# Patient Record
Sex: FEMALE | Race: WHITE | Employment: OTHER
[De-identification: names, ages, dates, MRNs, and addresses within clinical notes are randomized per-mention and may not be internally consistent; named-entity substitution may affect disease eponyms.]

---

## 2017-07-01 ENCOUNTER — HEALTH MAINTENANCE LETTER (OUTPATIENT)
Age: 72
End: 2017-07-01

## 2017-08-22 ENCOUNTER — OFFICE VISIT (OUTPATIENT)
Dept: ALLERGY | Facility: CLINIC | Age: 72
End: 2017-08-22
Payer: COMMERCIAL

## 2017-08-22 VITALS
BODY MASS INDEX: 33.81 KG/M2 | SYSTOLIC BLOOD PRESSURE: 125 MMHG | DIASTOLIC BLOOD PRESSURE: 81 MMHG | WEIGHT: 203.2 LBS | OXYGEN SATURATION: 94 % | HEART RATE: 93 BPM

## 2017-08-22 DIAGNOSIS — H10.13 ALLERGIC CONJUNCTIVITIS, BILATERAL: ICD-10-CM

## 2017-08-22 DIAGNOSIS — J30.89 OTHER ALLERGIC RHINITIS: Primary | ICD-10-CM

## 2017-08-22 DIAGNOSIS — J30.0 CHRONIC VASOMOTOR RHINITIS: ICD-10-CM

## 2017-08-22 PROCEDURE — 36415 COLL VENOUS BLD VENIPUNCTURE: CPT | Performed by: ALLERGY & IMMUNOLOGY

## 2017-08-22 PROCEDURE — 86003 ALLG SPEC IGE CRUDE XTRC EA: CPT | Performed by: ALLERGY & IMMUNOLOGY

## 2017-08-22 PROCEDURE — 99000 SPECIMEN HANDLING OFFICE-LAB: CPT | Performed by: ALLERGY & IMMUNOLOGY

## 2017-08-22 PROCEDURE — 99204 OFFICE O/P NEW MOD 45 MIN: CPT | Mod: 25 | Performed by: ALLERGY & IMMUNOLOGY

## 2017-08-22 PROCEDURE — 95004 PERQ TESTS W/ALRGNC XTRCS: CPT | Performed by: ALLERGY & IMMUNOLOGY

## 2017-08-22 RX ORDER — LOPERAMIDE HYDROCHLORIDE 2 MG/1
TABLET ORAL
COMMUNITY
Start: 2012-08-24

## 2017-08-22 RX ORDER — AMITRIPTYLINE HYDROCHLORIDE 50 MG/1
50 TABLET ORAL
COMMUNITY
Start: 2017-08-04

## 2017-08-22 RX ORDER — DOXYCYCLINE 50 MG/1
CAPSULE ORAL
Refills: 11 | COMMUNITY
Start: 2017-06-13

## 2017-08-22 RX ORDER — AZELASTINE HYDROCHLORIDE 0.5 MG/ML
1 SOLUTION/ DROPS OPHTHALMIC 2 TIMES DAILY
Qty: 1 BOTTLE | Refills: 11 | Status: SHIPPED | OUTPATIENT
Start: 2017-08-22

## 2017-08-22 RX ORDER — IPRATROPIUM BROMIDE 42 UG/1
2 SPRAY, METERED NASAL 4 TIMES DAILY PRN
Qty: 1 BOX | Refills: 3 | Status: SHIPPED | OUTPATIENT
Start: 2017-08-22

## 2017-08-22 NOTE — MR AVS SNAPSHOT
After Visit Summary   8/22/2017    Kassy Calix    MRN: [id number]           Patient Information     Date Of Birth          1945        Visit Information        Provider Department      8/22/2017 11:00 AM Chelsey Rogel MD [facility] [place]        Today's Diagnoses     Other allergic rhinitis    -  1    Allergic conjunctivitis, bilateral        Chronic vasomotor rhinitis          Care Instructions    If you have any questions regarding your allergies, asthma, or what we discussed during your visit today please call the allergy clinic or contact us via [employer].    [facility]: [phone]      You can try the following medications:  Ipratropium nasal spray - Use 2 sprays in each nostril up to 4 times daily as needed to help with the runny nose  Azelastine eye drops - 1 drop in each eye twice daily as needed      We will contact you in about 1 week with your lab results          Follow-ups after your visit        Who to contact     If you have questions or need follow up information about today's clinic visit or your schedule please contact [facility] directly at [phone].  Normal or non-critical lab and imaging results will be communicated to you by Pap[employer]ik[employer]art, letter or phone within 4 business days after the clinic has received the results. If you do not hear from us within 7 days, please contact the clinic through [employer] or phone. If you have a critical or abnormal lab result, we will notify you by phone as soon as possible.  Submit refill requests through [employer] or call your pharmacy and they will forward the refill request to us. Please allow 3 business days for your refill to be completed.          Additional Information About Your Visit        Papriikahart Information     [employer] gives you secure access to your electronic health record. If you see a primary care provider, you can also send messages to your care team and make appointments. If you have questions,  please call your primary care clinic.  If you do not have a primary care provider, please call [phone] and they will assist you.        Care EveryWhere ID     This is your Care EveryWhere ID. This could be used by other organizations to access your [facility] records  [id number]        Your Vitals Were     Pulse Pulse Oximetry BMI (Body Mass Index)             93 94% 33.81 kg/m2          Blood Pressure from Last 3 Encounters:   08/22/17 125/81   10/17/16 120/80   09/16/16 136/72    Weight from Last 3 Encounters:   08/22/17 92.2 kg (203 lb 3.2 oz)   10/17/16 97.1 kg (214 lb)   09/16/16 98 kg (216 lb)              We Performed the Following     Allergen alternaria alternata IgE     Allergen tyshawn white IgE     Allergen aspergillus fumigatus IgE     Allergen cat epithellium IgE     Allergen Cedar IgE     Allergen cladosporium herbarum IgE     Allergen cottonwood IgE     Allergen D farinae IgE     Allergen D pteronyssinus IgE     Allergen dog epithelium IgE     Allergen elm IgE     Allergen English plantain IgE     Allergen Epicoccum purpurascens IgE     Allergen giant ragweed IgE     Allergen Nicholas grass IgE     Allergen lamb's quarter IgE     Allergen maple box elder IgE     Allergen Mugwort IgE     Allergen oak white IgE     Allergen orchard grass IgE     Allergen penicillium notatum IgE     Allergen ragweed short IgE     Allergen Red Galvin IgE     Allergen Sagebrush Wormwood IgE     Allergen Sheep Sorrel IgE     Allergen silver  birch IgE     Allergen thistle Russian IgE     Allergen lacy IgE     Allergen Tree White Galvin IgE     Allergen Flushing Tree     Allergen Weed Nettle IgE     Allergen white pine IgE     Allergen, Kochia/Firebush          Today's Medication Changes          These changes are accurate as of: 8/22/17 11:52 AM.  If you have any questions, ask your nurse or doctor.               Start taking these medicines.        Dose/Directions    azelastine 0.05 % Soln ophthalmic solution    Commonly known as:  OPTIVAR   Used for:  Allergic conjunctivitis, bilateral   Started by:  Chelsey Rogel MD        Dose:  1 drop   Apply 1 drop to eye 2 times daily   Quantity:  1 Bottle   Refills:  11       ipratropium 0.06 % spray   Commonly known as:  ATROVENT   Used for:  Chronic vasomotor rhinitis   Started by:  Chelsey Rogel MD        Dose:  2 spray   Spray 2 sprays into both nostrils 4 times daily as needed for rhinitis   Quantity:  1 Box   Refills:  3            Where to get your medicines      These medications were sent to [facility] [id number] [place] [address] [address] [facility] [address] [place] [address] [facility]  [address], [facility] [address]     Phone:  [phone]     azelastine 0.05 % Soln ophthalmic solution    ipratropium 0.06 % spray                Primary Care Provider Office Phone # Fax #    Tiki Patterson [phone]       [place] [address] [place] [address]        Equal Access to Services     [facility] AH: Hadii aad ku hadasho Soomaali, waaxda luqadaha, qaybta kaalmada adeegyada, waxay padilla hayelizabeth salter . So [facility] [phone].    ATENCIÓN: Si habla español, tiene a eng disposición servicios gratuitos de asistencia lingüística. [facility] [phone].    We comply with applicable federal civil rights laws and [place] laws. We do not discriminate on the basis of race, color, national origin, age, disability sex, sexual orientation or gender identity.            Thank you!     Thank you for choosing [facility]  for your care. Our goal is always to provide you with excellent care. Hearing back from our patients is one way we can continue to improve our services. Please take a few minutes to complete the written survey that you may receive in the mail after your visit with us. Thank you!             Your Updated Medication List - Protect others around you: Learn how to safely use, store and throw  away your medicines at www.disposemymeds.org.          This list is accurate as of: 8/22/17 11:52 AM.  Always use your most recent med list.                   Brand Name Dispense Instructions for use Diagnosis    amitriptyline 50 MG tablet    ELAVIL     Take 50 mg by mouth        azelastine 0.05 % Soln ophthalmic solution    OPTIVAR    1 Bottle    Apply 1 drop to eye 2 times daily    Allergic conjunctivitis, bilateral       CALCIUM CITRATE PO      Take 1,200 mg by mouth daily.        doxycycline 20 MG tablet    PERIOSTAT          doxycycline Monohydrate 50 MG Caps capsule      TK 1 C PO BID        ipratropium 0.06 % spray    ATROVENT    1 Box    Spray 2 sprays into both nostrils 4 times daily as needed for rhinitis    Chronic vasomotor rhinitis       levothyroxine 150 MCG tablet    SYNTHROID/LEVOTHROID          loperamide 2 MG tablet    IMODIUM A-D     Takes 4 tabs daily.        metroNIDAZOLE 0.75 % topical gel    METROGEL          Multi-vitamin Tabs tablet   Generic drug:  multivitamin, therapeutic with minerals      1 TABLET DAILY        ranitidine 150 MG tablet    ZANTAC     Take 1/2 to 1 tab po qd        sertraline 100 MG tablet    ZOLOFT     Take 1 tablet by mouth daily.        vitamin D 2000 UNITS tablet      1 tablet daily        VOLTAREN 1 % Gel topical gel   Generic drug:  diclofenac      Apply 4 g topically

## 2017-08-22 NOTE — NURSING NOTE
"Chief Complaint   Patient presents with     Consult     post nasal drip, itchy and water eyes.        Initial /81  Pulse 93  Wt 92.2 kg (203 lb 3.2 oz)  SpO2 94%  BMI 33.81 kg/m2 Estimated body mass index is 33.81 kg/(m^2) as calculated from the following:    Height as of 9/16/16: 1.651 m (5' 5\").    Weight as of this encounter: 92.2 kg (203 lb 3.2 oz).  Medication Reconciliation: complete   Linda Raman MA.... 11:00 AM....8/22/2017      "

## 2017-08-22 NOTE — PATIENT INSTRUCTIONS
If you have any questions regarding your allergies, asthma, or what we discussed during your visit today please call the allergy clinic or contact us via [employer].    [facility]: [phone]      You can try the following medications:  Ipratropium nasal spray - Use 2 sprays in each nostril up to 4 times daily as needed to help with the runny nose  Azelastine eye drops - 1 drop in each eye twice daily as needed      We will contact you in about 1 week with your lab results

## 2017-08-22 NOTE — PROGRESS NOTES
Kassy Calix was seen in the Allergy Clinic at [facility]. The following are my recommendations regarding her Allergic Rhinitis, Allergic Conjunctivitis and Vasomotor Rhinitis    1. Will obtain in vitro IgE testing to seasonal and perennial aeroallergens  2. Begin azelastine eye drops, 1 drop in each eye twice daily as needed  3. Begin ipratropium nasal spray, 2 sprays in each nostril up to 4 times daily as needed  4. Consider allergen immunotherapy treatment pending lab results  5. Schedule follow-up pending lab results       Kassy Calix is a 71 year old White female being seen today in consultation for possible allergies. She reports having symptoms of itchy and watery eyes, rhinorrhea, and post-nasal drainage that are present throughout the year without seasonal variation. Kassy feels that allergies may be triggering her symptoms. She underwent allergy testing in 2006 and believes she was noted to be allergic to dust mites and molds at that time. Kassy states that her sister has similar symptoms and has been treated with kenalog injections every 4 months and she is interested in similar therapeutic measures. She feels that her quality of life is significantly impacted by these symptoms. She reports having to constantly blow her nose and carry tissues with her. Kassy has tried oral antihistamines but feels that these affect her eyes and cause them to feel dry and lead to changes in her vision. She has also tried flonase and astelin in the past and not found them to be particularly helpful. Kassy feels her symptoms have been worsening over the past 10 years despite implementation of allergen avoidance measures for dust mite such as frequent washing of bedding, regular vacuuming, and use of allergen protective covers.      Past Medical History:   Diagnosis Date     Allergies      Arthritis 7/23/2014     Bipolar affective (H)      Mild persistent asthma      OA (osteoarthritis) 7/2/2013     Thyroid  nodule      Thyroid nodule      Family History   Problem Relation Age of Onset     CANCER Mother      kidney     GASTROINTESTINAL DISEASE Mother      Depression Mother      Genitourinary Problems Mother      HEART DISEASE Father      Lipids Sister      DIABETES Sister      Hypertension Sister      Obesity Sister      Allergies Sister      Arthritis Sister      Cardiovascular Maternal Grandfather      Obesity Paternal Grandmother      Eye Disorder No family hx of      Past Surgical History:   Procedure Laterality Date     ADENOIDECTOMY       ARTHROSCOPY KNEE RT/LT      right      BUNIONECTOMY RT/LT      left     C APPENDECTOMY       C THYROIDECTOMY      right hemithyroidectomy and isthmusectomy      CHOLECYSTECTOMY, OPEN       KNEE SURGERY         ENVIRONMENTAL HISTORY: [place]. The home is heated with a gas furnace. They does have central air conditioning. The patient's bedroom is furnished with carpeting in bedroom, allergen mattress cover, allergen pillowcase cover and fabric window coverings. Pets inside the house include None. There is not history of cockroach or mice infestation. There is/are 0 smokers in the house. The house does not have a damp basement.     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 She lives with her self.      REVIEW OF SYSTEMS:  General: negative for weight gain. negative for weight loss. negative for changes in sleep.   Eyes: positive  for itching. positive  for redness. positive  for tearing/watering.  Ears: negative for fullness. negative for hearing loss. negative for dizziness.   Nose: positive  for snoring.negative for changes in smell. positive  for drainage.   Throat: negative for hoarseness. negative for sore throat. negative for trouble swallowing.   Lungs: negative for shortness of breath.negative for wheezing. negative for sputum production.   Cardiovascular: negative for chest pain. negative for  swelling of ankles. negative for fast or irregular heartbeat.   Gastrointestinal: negative for nausea. negative for heartburn. negative for acid reflux.   Musculoskeletal: negative for joint pain. negative for joint stiffness. negative for joint swelling.   Neurologic: negative for seizures. negative for fainting. negative for weakness.   Psychiatric: negative for changes in mood. negative for anxiety.   Endocrine: negative for cold intolerance. negative for heat intolerance. negative for tremors.   Hematologic: negative for easy bruising. negative for easy bleeding.  Integumentary: negative for rash. negative for scaling. negative for nail changes.       Current Outpatient Prescriptions:      amitriptyline (ELAVIL) 50 MG tablet, Take 50 mg by mouth, Disp: , Rfl:      loperamide (IMODIUM A-D) 2 MG tablet, Takes 4 tabs daily., Disp: , Rfl:      ranitidine (ZANTAC) 150 MG tablet, Take 1/2 to 1 tab po qd, Disp: , Rfl:      doxycycline Monohydrate 50 MG CAPS capsule, TK 1 C PO BID, Disp: , Rfl: 11     doxycycline (PERIOSTAT) 20 MG tablet, , Disp: , Rfl: 11     levothyroxine (SYNTHROID, LEVOTHROID) 150 MCG tablet, , Disp: , Rfl: 1     metroNIDAZOLE (METROGEL) 0.75 % gel, , Disp: , Rfl: 11     CALCIUM CITRATE PO, Take 1,200 mg by mouth daily., Disp: , Rfl:      SERTRALINE  MG OR TABS, Take 1 tablet by mouth daily., Disp: , Rfl:      MULTI-VITAMIN OR TABS, 1 TABLET DAILY, Disp: , Rfl:      VITAMIN D 2000 UNIT OR TABS, 1 tablet daily, Disp: , Rfl:      diclofenac (VOLTAREN) 1 % GEL, Apply 4 g topically, Disp: , Rfl:   Immunization History   Administered Date(s) Administered     Influenza (H1N1) 01/19/2010     Influenza (High Dose) 3 valent vaccine 10/06/2010, 10/20/2016     Influenza (IIV3) 10/24/2011     Pneumococcal 23 valent 05/26/2011     TDAP Vaccine (Adacel) 04/27/2010     Allergies   Allergen Reactions     Montelukast Anaphylaxis     Advair Diskus      Thrush     Azathioprine Diarrhea and GI Disturbance      Conjugated Estrogens Muscle Pain (Myalgia)     Dust Mites      Fluticasone-Salmeterol Other (See Comments)     Thrush     Levaquin      Mold      Mometasone Visual Disturbance     Nasonex      Eyes blurry      Simvastatin Unknown     Abnormal lft      Singulair Anaphylaxis         EXAM:   /81  Pulse 93  Wt 92.2 kg (203 lb 3.2 oz)  SpO2 94%  BMI 33.81 kg/m2  GENERAL APPEARANCE: alert, cooperative and not in distress  SKIN: no rashes, no lesions  HEAD: atraumatic, normocephalic  EYES: lids and lashes normal, conjunctivae and sclerae clear, pupils equal, round, reactive to light, EOM full and intact  ENT: no scars or lesions, nasal exam showed no discharge, swelling or lesions noted, otoscopy showed external auditory canals clear, tympanic membranes normal, tongue midline and normal, soft palate, uvula, and tonsils normal  NECK: no asymmetry, masses, or scars, supple without significant adenopathy  LUNGS: unlabored respirations, no intercostal retractions or accessory muscle use, clear to auscultation without rales or wheezes  HEART: regular rate and rhythm without murmurs and normal S1 and S2  MUSCULOSKELETAL: no musculoskeletal defects are noted  NEURO: no focal deficits noted, mental status intact  PSYCH: does not appear depressed or anxious    WORKUP: Skin testing    Skin prick testing was performed to our adult aeroallergen panel. Patient had negative reaction to all antigens tested including histamine control.     ASSESSMENT/PLAN:  Kassy Calix is a 71 year old female here for evaluation of possible allergies. Skin prick testing was attempted however she had a blunted histamine response. We discussed obtaining additional evaluation with IgE testing. Kassy was counseled regarding potential allergic and non-allergic causes for her symptoms. She was counseled regarding management of allergic rhinitis with medications, allergen avoidance measures, and immunotherapy treatment. We discussed that she would be  a candidate for immunotherapy if laboratory evaluation revealed allergic sensitization.    1. Will obtain in vitro IgE testing to seasonal and perennial aeroallergens  2. Begin azelastine eye drops, 1 drop in each eye twice daily as needed  3. Begin ipratropium nasal spray, 2 sprays in each nostril up to 4 times daily as needed  4. Consider allergen immunotherapy treatment pending lab results  5. Schedule follow-up pending lab results       Chelsey Rogel MD  Allergy/Immunology  [facility] and [place], [place]      Chart documentation done in part with Dragon Voice Recognition Software. Although reviewed after completion, some word and grammatical errors may remain.

## 2017-08-23 LAB
CALIF WALNUT IGE QN: <0.1 KU/L
DEPRECATED MISC ALLERGEN IGE RAST QL: NORMAL

## 2017-08-25 LAB
A ALTERNATA IGE QN: <0.1 KU(A)/L
A FUMIGATUS IGE QN: <0.1 KU(A)/L
C HERBARUM IGE QN: <0.1 KU(A)/L
CAT DANDER IGG QN: <0.1 KU(A)/L
CEDAR IGE QN: <0.1 KU(A)/L
COCKSFOOT IGE QN: <0.1 KU(A)/L
COMMON RAGWEED IGE QN: <0.1 KU(A)/L
COTTONWOOD IGE QN: <0.1 KU(A)/L
D FARINAE IGE QN: <0.1 KU(A)/L
D PTERONYSS IGE QN: <0.1 KU(A)/L
DOG DANDER+EPITH IGE QN: <0.1 KU(A)/L
E PURPURASCENS IGE QN: <0.1 KU(A)/L
EAST WHITE PINE IGE QN: <0.1 KU(A)/L
ENGL PLANTAIN IGE QN: <0.1 KU(A)/L
FIREBUSH IGE QN: <0.1 KU(A)/L
GIANT RAGWEED IGE QN: <0.1 KU(A)/L
GOOSEFOOT IGE QN: <0.1 KU(A)/L
JOHNSON GRASS IGE QN: <0.1 KU(A)/L
MAPLE IGE QN: <0.1 KU(A)/L
MUGWORT IGE QN: <0.1 KU(A)/L
NETTLE IGE QN: <0.1 KU(A)/L
P NOTATUM IGE QN: <0.1 KU(A)/L
RED MULBERRY IGE QN: <0.1 KU(A)/L
SALTWORT IGE QN: <0.1 KU(A)/L
SHEEP SORREL IGE QN: <0.1 KU(A)/L
SILVER BIRCH IGE QN: <0.1 KU(A)/L
TIMOTHY IGE QN: <0.1 KU(A)/L
WHITE ASH IGE QN: <0.1 KU(A)/L
WHITE ELM IGE QN: <0.1 KU(A)/L
WHITE MULBERRY IGE QN: <0.1
WHITE OAK IGE QN: <0.1 KU(A)/L
WORMWOOD IGE QN: <0.1 KU(A)/L

## 2018-07-08 ENCOUNTER — HEALTH MAINTENANCE LETTER (OUTPATIENT)
Age: 73
End: 2018-07-08

## 2019-10-04 ENCOUNTER — HEALTH MAINTENANCE LETTER (OUTPATIENT)
Age: 74
End: 2019-10-04

## 2020-02-08 ENCOUNTER — HEALTH MAINTENANCE LETTER (OUTPATIENT)
Age: 75
End: 2020-02-08

## 2020-11-08 ENCOUNTER — HEALTH MAINTENANCE LETTER (OUTPATIENT)
Age: 75
End: 2020-11-08

## 2021-03-27 ENCOUNTER — HEALTH MAINTENANCE LETTER (OUTPATIENT)
Age: 76
End: 2021-03-27

## 2021-05-26 ENCOUNTER — RECORDS - HEALTHEAST (OUTPATIENT)
Dept: ADMINISTRATIVE | Facility: CLINIC | Age: 76
End: 2021-05-26

## 2021-05-27 ENCOUNTER — RECORDS - HEALTHEAST (OUTPATIENT)
Dept: ADMINISTRATIVE | Facility: CLINIC | Age: 76
End: 2021-05-27

## 2021-05-28 ENCOUNTER — RECORDS - HEALTHEAST (OUTPATIENT)
Dept: ADMINISTRATIVE | Facility: CLINIC | Age: 76
End: 2021-05-28

## 2021-05-29 ENCOUNTER — RECORDS - HEALTHEAST (OUTPATIENT)
Dept: ADMINISTRATIVE | Facility: CLINIC | Age: 76
End: 2021-05-29

## 2021-05-31 ENCOUNTER — RECORDS - HEALTHEAST (OUTPATIENT)
Dept: ADMINISTRATIVE | Facility: CLINIC | Age: 76
End: 2021-05-31

## 2021-09-11 ENCOUNTER — HEALTH MAINTENANCE LETTER (OUTPATIENT)
Age: 76
End: 2021-09-11

## 2022-04-23 ENCOUNTER — HEALTH MAINTENANCE LETTER (OUTPATIENT)
Age: 77
End: 2022-04-23

## 2022-10-29 ENCOUNTER — HEALTH MAINTENANCE LETTER (OUTPATIENT)
Age: 77
End: 2022-10-29

## 2023-06-01 ENCOUNTER — HEALTH MAINTENANCE LETTER (OUTPATIENT)
Age: 78
End: 2023-06-01